# Patient Record
Sex: MALE | Race: WHITE | URBAN - METROPOLITAN AREA
[De-identification: names, ages, dates, MRNs, and addresses within clinical notes are randomized per-mention and may not be internally consistent; named-entity substitution may affect disease eponyms.]

---

## 2024-11-05 ENCOUNTER — HOSPITAL ENCOUNTER (EMERGENCY)
Age: 54
Discharge: HOME | End: 2024-11-05
Payer: COMMERCIAL

## 2024-11-05 VITALS
DIASTOLIC BLOOD PRESSURE: 61 MMHG | HEART RATE: 65 BPM | BODY MASS INDEX: 26.32 KG/M2 | WEIGHT: 188 LBS | OXYGEN SATURATION: 99 % | RESPIRATION RATE: 16 BRPM | SYSTOLIC BLOOD PRESSURE: 157 MMHG | TEMPERATURE: 98.1 F | HEIGHT: 71 IN

## 2024-11-05 DIAGNOSIS — Z51.89 ENCOUNTER FOR WOUND CARE: ICD-10-CM

## 2024-11-05 DIAGNOSIS — Z48.02 ENCOUNTER FOR REMOVAL OF SUTURES: Primary | ICD-10-CM

## 2024-11-05 PROCEDURE — 99282 EMERGENCY DEPT VISIT SF MDM: CPT

## 2024-11-05 ASSESSMENT — PAIN - FUNCTIONAL ASSESSMENT: PAIN_FUNCTIONAL_ASSESSMENT: NONE - DENIES PAIN

## 2024-11-05 ASSESSMENT — ENCOUNTER SYMPTOMS: COLOR CHANGE: 0

## 2024-11-05 NOTE — ED PROVIDER NOTES
[facility] EMERGENCY DEPARTMENT  EMERGENCY DEPARTMENT ENCOUNTER        Pt Name: Gilberto Rubio  MRN: [id number]  Birthdate 1970  Date of evaluation: 11/5/2024  Provider: Evelyn Pool PA-C  PCP: Wendy Meyer MD  Note Started: 10:30 AM EST 11/5/24      LYN. I have evaluated this patient.        CHIEF COMPLAINT       Chief Complaint   Patient presents with    Suture / Staple Removal     Suture removal on left arm.        HISTORY OF PRESENT ILLNESS: 1 or more Elements     History from : Patient    Limitations to history : None    Gilberto Rubio is a 54 y.o. male who presents to the emergency department for suture removal and wound care.  The patient was recently seen by dermatology several weeks ago and had a punch biopsy of suspected squamous skin cancer on the left forearm.  3 sutures were placed by dermatologist.  He tried getting the sutures out himself however was unable to do so.  Therefore, he came to the emergency department for assistance with this.  He has no numbness, tingling, weakness or other concerns at this time.    Nursing Notes were all reviewed and agreed with or any disagreements were addressed in the HPI.    REVIEW OF SYSTEMS :      Review of Systems   Constitutional:  Negative for chills and fever.   HENT: Negative.     Musculoskeletal:  Negative for arthralgias, joint swelling and myalgias.   Skin:  Positive for wound. Negative for color change, pallor and rash.   Neurological:  Negative for weakness and numbness.   All other systems reviewed and are negative.      Positives and Pertinent negatives as per HPI.     SURGICAL HISTORY     Past Surgical History:   Procedure Laterality Date    KNEE SURGERY      left knee    TOE SURGERY      had extra toes removed       CURRENTMEDICATIONS       There are no discharge medications for this patient.      ALLERGIES     Shellfish-derived products    FAMILYHISTORY     History reviewed. No pertinent family